# Patient Record
(demographics unavailable — no encounter records)

---

## 2025-04-21 NOTE — HISTORY OF PRESENT ILLNESS
[FreeTextEntry1] : Adele 65yo lady with a PMH of breast CA (mastectomy/chemo/radiation), mediastinal lymphadenopathy followed by Dr. Patel (nothing to be done), pulm sarcoidosis, thalassemia minor, HL, DM.  Here for eval of palps. Occurred about a month ago; lasted about 2 weeks; intermittent and resolved spontaneously.  Thought 2/2 coffee but continued after she stopped coffee.  No dyspnea/CP/syncope. EKG sinus 90s w/ no ischemic changes

## 2025-04-21 NOTE — DISCUSSION/SUMMARY
[FreeTextEntry1] : Adele 63yo lady with a PMH of breast CA (mastectomy/chemo/radiation), mediastinal lymphadenopathy followed by Dr. Patel (nothing to be done), pulm sarcoidosis, thalassemia minor, HL, DM.  Here for eval of palps. Occurred about a month ago; lasted about 2 weeks; intermittent and resolved spontaneously.  Thought 2/2 coffee but continued after she stopped coffee.  No dyspnea/CP/syncope. EKG sinus 90s w/ no ischemic changes  1)Palps: -labs including TSH -2D echo to r/o cardiac sarcoid 2)Abnl EKG: -plain exercise stress test [EKG obtained to assist in diagnosis and management of assessed problem(s)] : EKG obtained to assist in diagnosis and management of assessed problem(s)

## 2025-05-21 NOTE — HISTORY OF PRESENT ILLNESS
[FreeTextEntry1] : Adele 65yo lady with a PMH of breast CA (mastectomy/chemo/radiation), mediastinal lymphadenopathy followed by Dr. Patel (nothing to be done), pulm sarcoidosis, thalassemia minor, HL, DM.  Here for eval of palps. Occurred about a month ago; lasted about 2 weeks; intermittent and resolved spontaneously.  Thought 2/2 coffee but continued after she stopped coffee.  No dyspnea/CP/syncope. EKG sinus 90s w/ no ischemic changes  05/21/2025 Comes in for f/u No Interval symptoms noted Patient is asymptomatic today